# Patient Record
Sex: FEMALE | Race: WHITE
[De-identification: names, ages, dates, MRNs, and addresses within clinical notes are randomized per-mention and may not be internally consistent; named-entity substitution may affect disease eponyms.]

---

## 2020-10-14 ENCOUNTER — DASHBOARD ENCOUNTERS (OUTPATIENT)
Age: 75
End: 2020-10-14

## 2020-10-15 ENCOUNTER — OFFICE VISIT (OUTPATIENT)
Dept: URBAN - METROPOLITAN AREA TELEHEALTH 2 | Facility: TELEHEALTH | Age: 75
End: 2020-10-15
Payer: MEDICARE

## 2020-10-15 DIAGNOSIS — K50.80 CROHN'S DISEASE OF BOTH SMALL AND LARGE INTESTINE: ICD-10-CM

## 2020-10-15 DIAGNOSIS — R53.83 FATIGUE: ICD-10-CM

## 2020-10-15 DIAGNOSIS — M19.90 ARTHRITIS: ICD-10-CM

## 2020-10-15 DIAGNOSIS — Z93.3 COLOSTOMY IN PLACE: ICD-10-CM

## 2020-10-15 PROCEDURE — 87493 C DIFF AMPLIFIED PROBE: CPT | Performed by: INTERNAL MEDICINE

## 2020-10-15 PROCEDURE — 99215 OFFICE O/P EST HI 40 MIN: CPT | Performed by: INTERNAL MEDICINE

## 2020-10-15 PROCEDURE — 82607 VITAMIN B-12: CPT | Performed by: INTERNAL MEDICINE

## 2020-10-15 PROCEDURE — 82306 VITAMIN D 25 HYDROXY: CPT | Performed by: INTERNAL MEDICINE

## 2020-10-15 PROCEDURE — 82746 ASSAY OF FOLIC ACID SERUM: CPT | Performed by: INTERNAL MEDICINE

## 2020-10-15 RX ORDER — TEMAZEPAM 15 MG/1
TAKE 1 CAPSULE (15 MG) BY ORAL ROUTE ONCE DAILY AT BEDTIME AS NEEDED CAPSULE ORAL 1
Qty: 0 | Refills: 0 | Status: ACTIVE | COMMUNITY
Start: 1900-01-01

## 2020-10-15 RX ORDER — PROMETHAZINE HYDROCHLORIDE 25 MG/1
TAKE 1/4 PILL AS NEEDED TABLET ORAL
Qty: 0 | Refills: 0 | Status: ACTIVE | COMMUNITY
Start: 1900-01-01

## 2020-10-15 RX ORDER — MORPHINE SULFATE 60 MG/1
TAKE 1 TABLET (60 MG) BY ORAL ROUTE EVERY 12 HOURS TABLET ORAL 2
Qty: 0 | Refills: 0 | Status: ACTIVE | COMMUNITY
Start: 1900-01-01

## 2020-10-15 RX ORDER — FUROSEMIDE 20 MG/1
TAKE 1 TABLET (20 MG) BY ORAL ROUTE 2 TIMES PER DAY TABLET ORAL 2
Qty: 0 | Refills: 0 | Status: ACTIVE | COMMUNITY
Start: 1900-01-01

## 2020-10-15 RX ORDER — OXYCODONE HYDROCHLORIDE AND ACETAMINOPHEN 7.5; 325 MG/1; MG/1
TAKE 1 TABLET BY ORAL ROUTE EVERY 6 HOURS AS NEEDED TABLET ORAL
Qty: 0 | Refills: 0 | Status: ACTIVE | COMMUNITY
Start: 1900-01-01

## 2020-10-15 RX ORDER — METRONIDAZOLE 500 MG/1
TAKE 2 TABLETS (1 GRAM) BY ORAL ROUTE 2 TIMES PER DAY TABLET, FILM COATED ORAL 2
Qty: 120 | Refills: 0 | Status: ACTIVE | COMMUNITY
Start: 1900-01-01

## 2020-10-15 RX ORDER — METRONIDAZOLE 250 MG/1
TAKE 1 TABLET (250 MG) BY ORAL ROUTE 3 TIMES PER DAY TABLET ORAL
Qty: 0 | Refills: 0 | Status: ACTIVE | COMMUNITY
Start: 1900-01-01

## 2020-10-15 RX ORDER — ACYCLOVIR 400 MG/1
TAKE 1 TABLET (400 MG) BY ORAL ROUTE 2 TIMES PER DAY TABLET ORAL 2
Qty: 0 | Refills: 0 | Status: ACTIVE | COMMUNITY
Start: 1900-01-01

## 2020-10-15 RX ORDER — LISINOPRIL 10 MG/1
TAKE 1 TABLET (10 MG) BY ORAL ROUTE ONCE DAILY TABLET ORAL 1
Qty: 0 | Refills: 0 | Status: ACTIVE | COMMUNITY
Start: 1900-01-01

## 2020-10-15 RX ORDER — GLUCOSAMINE HCL/CHONDROITIN SU 500-400 MG
TAKE 2 CAPS DAILY CAPSULE ORAL
Qty: 0 | Refills: 0 | Status: ACTIVE | COMMUNITY
Start: 1900-01-01

## 2020-10-15 RX ORDER — INSULIN GLARGINE 100 [IU]/ML
INJECT BY SUBCUTANEOUS ROUTE AS PER INSULIN PROTOCOL ---PT TAKES 15 UNITS QHS INJECTION, SOLUTION SUBCUTANEOUS
Qty: 1 | Refills: 0 | Status: ACTIVE | COMMUNITY
Start: 1900-01-01

## 2020-10-15 NOTE — HPI-TODAY'S VISIT:
76 yo female with Crohn's disease and colostomy, converted from ileostomy.  Has 2 cats. Spent $1500 trying to get them health. One has a bad bladder issue. Another cat has a mass the size of a tennis ball. Lives with 2 cats.  Bag came off 2 days ago. Was cleaning herself and things ran down the tub.  She wonders if there is something in the environment that is killing her cat.  She needs me to send her. She thinks that some foreign or environmental factor has gotten in her body. She has tried to get the health department to come out. It is not just affecting her digestive system. She has started to shake. Her blood pressure has dropped so low.  Blood pressure of 290/120-130. BP was done by Dr. Dhillon, pain management doctor. He was treating lower lumbar nerve treated on back. 2 herniated discs in the neck.  Will get QDX testing. Vet suggested having doctor request the health department come out to find out what is killing her and her cats.  She has "sores" on her body.  2 floor home. Girl helping her is a nurse. She lost 100 lb in 2 months. She has arsenic and iodine and lead.  Brittney's father was a . She feels it is like a bee stinging her face.  Primary doctor is Chago Griffith MD.  Her daughter lives in [place] and her daughter came up for a few days and will attest that she is not scratching herself.  My suggestion is that I help her get an appointment set up at the [facility].  Stuff is attacking her and her animals. Christo has not seen anything like it.  Blood and stool tests  Call the health department  [facility] Blood work QDX  Now BP is 140/78.   2/11/19    74 yo female with Crohn's diease and diverting colostomy for severe fistulizing Crohnnn's disease, ileostomy at [place] and then revision from ileostomy to colostomy.  No Crohn's syx.  No stoma issues.  On vegetarian diet.  Gets blood work with Dr. Griffith _ _ [facility] [place].  Prefers no labs today _ _ follow with Dr. Griffith.  No specific CD meds _ __ _gets B 12 and fish oil   ==============  July 5, 2018  74 yo female with Crohns disease comes in for Crohn's f/u.  Crohns is stable and has colostomy and outputs are stable and fine.  2012 Dr. Erwin revised her colostomy due to mesh.  Crohns disesase seems to hold steady despite d/c of Cimzia 2 years ago.  No ongoing meds for Crohn's disease.  She feels avoiding meat has been helpful.  Labs 1/29/2018 wnl with variation.  Dr. Griffith is checking Diabetes and vit d and all is stable.         Past Medical History  Disease Name Date Onset Notes  Arthritis unk 03/02/2016 -   Asthma unk 01/29/2018 - 03/02/2016 -   Blood transfusion unk _ _   Colostomy in place 02/11/2019 _ _   Crohn's Disease unk _ _   Diabetes unk 03/02/2016 -   Hypertension unk 03/02/2016 -   Influenza Immunization unk 03/02/2016 -   Nerve/Muscle Disease unk 03/02/2016 -   OTHER unk respiratory issues, fistula  Pneumococcal Immunization unk 03/02/2016 -   Thyroid disease unk _ _       Past Surgical History  Procedure Name Date Notes  Colon Surgery 2000 03/02/2016 -   Colonoscopy 4/11/2017 02/11/2019 - 07/05/2018 - 01/29/2018 - 03/02/2016 -   EGD 4/11/2017 02/11/2019 - 01/29/2018 - 03/02/2016 -   Hernia Repair [id number] 03/02/2016 -   Other 2012-remove hernia mesj,  03/02/2016 -   Tonsillectomy 1950 03/02/2016 -   Vaginal hysterectomy 1975 03/02/2016 -       Medication List  Name Date Started Instructions  Ativan 0.5 mg oral tablet  take 1 tablet (0.5 mg) by oral route 2 times per day as needed  Bronkids 0.6-1.5-2.75 mg/mL oral drops  Take 2 times daily  Calcium 500 oral  _ _   Calcium 600 600 mg (1,500 mg) oral tablet  take 2 tablets by oral route daily  gabapentin 400 mg oral capsule  take 1 capsule (400 mg) by oral route 3 times per day  Hair,Skin & Nails oral  _ _   inhaler for asthma  _ _   Lantus 100 unit/mL subcutaneous solution  inject by subcutaneous route as per insulin protocol _ _-pt takes 15 units qhs  Lasix 40 mg oral tablet  take 1 tablet (40 mg) by oral route once daily  lisinopril 20 mg oral tablet  take 1 tablet (20 mg) by oral route once daily  morphine 200 mg oral tablet extended release  take 1 tablet (200 mg) by oral route every 12 hours  MS contin 60 mg tablets  _ _   multivitamin oral  _ _   paroxetine HCl 20 mg oral tablet  _ _   Percocet oral  _ _   Phenergan oral  _ _   Probiotic 20 billion cell oral capsule  _ _   promethazine 25 mg oral tablet  take 1/4 pill as needed  Ventolin HFA 90 mcg/actuation inhalation HFA aerosol inhaler  inhale 1 - 2 puffs by inhalation route every 4 hours as needed  vitamin B12-folic acid 500-400 mcg oral tablet  _ _   Vitamin C oral  _ _   Vitamin C 500 mg oral capsule, extended release  _ _   Vitamin D3 3,000 units x2 daily  _ _       Allergy List  Allergen Name Date Reaction Notes  No Known Food Allergies _ _  _ _  _ _   Animal Dander _ _  _ _  _ _   Arava _ _  _ _  _ _   Rodríguez Juan R _ _  _ _  _ _   Levaquin _ _  _ _  _ _   methotrexate _ _  _ _  _ _   Molds _ _  _ _  _ _   other _ _  _ _  all outdoor  Pollen allergies _ _  _ _  _ _   Ragweed _ _  _ _  _ _   Reglan _ _  _ _  _ _   Remicade _ _  _ _  _ _   Trees _ _  _ _  _ _       Family Medical History  Disease Name Relative/Age Notes  Family history of ulcerative colitis Mother/  _ _   Family history of acid reflux/GERD Brother/ Mother/ Son/  _ _   Family history of hyperlipidemia/high cholesterol (HLD) Son/  _ _   Family history of diabetes Son/  _ _   Family history of heart disease/coronary artery disease Mother/  _ _       Social History  Finding Status Start/Stop Quantity Notes  Alcohol Former _ _/_ _ _ _  02/11/2019 - 07/05/2018 - 01/29/2018 - 02/13/2017 - 03/02/2016 - none  Denies illicit substance abuse _ _  _ _/_ _ _ _  02/11/2019 -   Denies substance abuse _ _  _ _/_ _ _ _  02/11/2019 -   Tobacco Former _ _/_ _ _ _  02/11/2019 - 07/05/2018 - 01/29/2018 - 02/13/2017 - 01/11/2017 - 03/02/2016 - former      Review of Systems  ConstitutionalDenies : body aches, chills, fatigue, fever, loss of appetite, malaise, night sweats, weight gain, weight loss  EyesDenies : blurred vision, visual changes  HENTDenies : Ear Pain/Ringing, hearing loss, Mouth Ulcers/Sores, nose bleeds, problems with gums/teeth, trouble swallowing  CardiovascularDenies : chest pain, leaky heart valves, heart murmur, heart racing/skipping, high blood pressure, palpitations  RespiratoryDenies : chronic cough, shortness of breath, wheezing or asthma symptoms  GastrointestinalDenies : Abdominal Pain/discomfort, Anal/Rectal Pain or Itching, Anal Spasm, Black Stool, Bloating/belching/gaseouness, Change of Bowel Habit, Constipation, Diarrhea/Loose Stool, Difficulty in Swallowing, Heartburn/esophageal reflux, Hemorrhoids, Indigestion, Mucus in Stool, Nausea/vomiting, Rectal Bleeding, Unintentional Weight Loss  GenitourinaryDenies : Blood in Urine, Burning/pain with Urination, frequency, Recent/frequent UTI, Kidney Stones  IntegumentDenies : itching/dry skin, jaundice, rashes, bumps or sores  NeurologicDenies : headaches, dizziness/vertigo, head trauma/injury, recent numbness/weakness, seizures  MusculoskeletalDenies : back pain, decreased range of motion, joint pain/arthritis, Problems Walking/calf or leg pain  EndocrineDenies : bruise easily, excessive thirst, heat/cold intolerance, history of high or low blood sugar  PsychiatricDenies : anxiety, changes in sleep pattern, depression, loss of memory  Heme-LymphDenies : Bleeding Problems, Enlarged Nodes/Swolen Glands, excessive bruising, history of anemia  Allergic-ImmunologicDenies : seasonal allergies    Vitals  Date Time BP Position Site L\R Cuff Size HR RR TEMP (F) WT  HT  BMI kg/m2 BSA m2 O2 Sat      02/11/2019 11:18 /66 Sitting    90 - R  97.3 158lbs 6oz 5'  3" 28.05 1.79        Physical Examination  ConstitutionalAppearance : Well nourished, well developed patient in no distress. Ambulating without difficulty.  Ability to Communicate : Normal communication ability  EyesConjunctivae and Eyelids : Conjunctivae and eyelids appear normal  Sclerae : White without injection  HENTHead :  Inspection : Normocephalic and atraumatic  Face :  Inspection : Face within normal limits  Ears :  External Ears : External ears within normal limits  Nose/Nasopharynx :  External Nose : External nose normal appearance, nares patent, no nasal discharge  Mouth and Throat :  General : Oral cavity appearance normal, breath odor normal  Lips : Appearance normal  NeckInspection and Palpation : Normal appearance without tenderness on palpation or deformities, trachea midline  Thyroid : Normal size without tenderness, nodules or masses  Jugular Veins : no JVD  ChestInspection of Chest : No lesions, deformities or traumatic injuries present. No significant scars are present.  Respiratory Effort : Breathing is unlabored without accessory muscle use  Palpation of Chest : Chest wall non-tender with no masses present. Tactile fremitus is normal  Auscultation : Normal breath sounds  CardiovascularHeart :  Auscultation : Heart rate is regular with normal rhythm. No murmurs are heard. No edema.  GastrointestinalAbdominal Exam : Abdomen soft without rigidity or guarding, abdomen non-tender to palpation, normal bowel sounds, no masses present, non-distended  Liver and Spleen : No hepatomegaly present. Liver is non-tender to palpation and spleen is not palpable.  LymphaticNeck : No lymphadenopathy present  Axillae : No lymphadenopathy present  Groin : No lymphadenopathy present  MusculoskeletalGait and Station : Normal Gait, normal station  Neck/Spine/Pelvis : No tenderness of deformities present.  SkinGeneral Inspection : No rashes, lesions or areas of discoloration present. Skin turgor is normal.  General Palpation : No abnormalities, masses or tenderness on palpation.  Neurologic and PsychiatricOrientation : Oriented to person, place and time  Sensation : Normal sensation  Memory : Short and long term memory intact.  Mood and Affect : Normal mood with an appropriate affect      Assessment  Crohn's Disease     555.2  Arthritis     716.90/M19.90  Colostomy in place     V44.3/Z93.3    Plan  OrdersPatient not identified as an unhealthy alcohol user when screene () - - 02/11/2019  Influenza immunization administered or previously received () - - 02/11/2019  BMI screening above normal () - - 02/11/2019  Current tobacco non-user (CAD, cap, COPD, PV) (dm) (IBD) (1036F, ) - - 02/11/2019  Colorectal cancer screening results documented and reviewed (PV) (3017F) - - 02/11/2019  Documentation of current medications. () - - 02/11/2019  InstructionsI have documented a list of current medications and reviewed it with the patient.  I encouraged the patient to diet and exercise.  DispositionReturn To Clinic in 1 year  CorrespondenceCC this document (Chago Griffith MD) - 2/11/2019    Gave her llist for labs by Dr. Griffith.     cc:  Chago Griffith M.D. [facility] [place]         Electronically Signed by: Vinny Hollingsworth MD -Author on February 11, 2019 11:44:25 AM